# Patient Record
Sex: FEMALE | NOT HISPANIC OR LATINO | URBAN - METROPOLITAN AREA
[De-identification: names, ages, dates, MRNs, and addresses within clinical notes are randomized per-mention and may not be internally consistent; named-entity substitution may affect disease eponyms.]

---

## 2019-03-07 ENCOUNTER — HOSPITAL ENCOUNTER (OUTPATIENT)
Dept: OTHER | Facility: HOSPITAL | Age: 84
Discharge: HOME | End: 2019-03-07
Attending: PODIATRIST

## 2019-03-07 ENCOUNTER — OFFICE VISIT CONVERTED (OUTPATIENT)
Dept: PODIATRY | Facility: CLINIC | Age: 84
End: 2019-03-07
Attending: PODIATRIST

## 2019-04-04 ENCOUNTER — OFFICE VISIT CONVERTED (OUTPATIENT)
Dept: PODIATRY | Facility: CLINIC | Age: 84
End: 2019-04-04
Attending: PODIATRIST

## 2021-05-15 VITALS
HEIGHT: 63 IN | OXYGEN SATURATION: 96 % | HEART RATE: 82 BPM | DIASTOLIC BLOOD PRESSURE: 73 MMHG | SYSTOLIC BLOOD PRESSURE: 131 MMHG | WEIGHT: 159 LBS | BODY MASS INDEX: 28.17 KG/M2

## 2021-05-16 VITALS
SYSTOLIC BLOOD PRESSURE: 148 MMHG | BODY MASS INDEX: 27.82 KG/M2 | DIASTOLIC BLOOD PRESSURE: 78 MMHG | WEIGHT: 157 LBS | HEART RATE: 87 BPM | OXYGEN SATURATION: 96 % | HEIGHT: 63 IN

## 2024-10-09 ENCOUNTER — NURSING HOME (OUTPATIENT)
Dept: FAMILY MEDICINE CLINIC | Age: 89
End: 2024-10-09
Payer: MEDICARE

## 2024-10-09 VITALS
WEIGHT: 164.6 LBS | HEART RATE: 86 BPM | RESPIRATION RATE: 18 BRPM | BODY MASS INDEX: 29.16 KG/M2 | OXYGEN SATURATION: 90 % | SYSTOLIC BLOOD PRESSURE: 141 MMHG | DIASTOLIC BLOOD PRESSURE: 71 MMHG | TEMPERATURE: 97.2 F

## 2024-10-09 DIAGNOSIS — N39.46 MIXED STRESS AND URGE URINARY INCONTINENCE: ICD-10-CM

## 2024-10-09 DIAGNOSIS — K59.00 CONSTIPATION, UNSPECIFIED CONSTIPATION TYPE: ICD-10-CM

## 2024-10-09 DIAGNOSIS — M54.42 CHRONIC BILATERAL LOW BACK PAIN WITH LEFT-SIDED SCIATICA: ICD-10-CM

## 2024-10-09 DIAGNOSIS — R60.0 LEG EDEMA, LEFT: ICD-10-CM

## 2024-10-09 DIAGNOSIS — M81.0 AGE-RELATED OSTEOPOROSIS WITHOUT CURRENT PATHOLOGICAL FRACTURE: ICD-10-CM

## 2024-10-09 DIAGNOSIS — R10.32 LEFT LOWER QUADRANT PAIN: Primary | ICD-10-CM

## 2024-10-09 DIAGNOSIS — E78.00 PURE HYPERCHOLESTEROLEMIA: ICD-10-CM

## 2024-10-09 DIAGNOSIS — E10.9 TYPE 1 DIABETES MELLITUS WITHOUT COMPLICATION: ICD-10-CM
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
## 2024-10-09 DIAGNOSIS — G89.29 CHRONIC BILATERAL LOW BACK PAIN WITH LEFT-SIDED SCIATICA: ICD-10-CM

## 2024-10-09 DIAGNOSIS — K21.9 GASTROESOPHAGEAL REFLUX DISEASE WITHOUT ESOPHAGITIS: ICD-10-CM

## 2024-10-09 DIAGNOSIS — I34.0 NONRHEUMATIC MITRAL VALVE REGURGITATION: ICD-10-CM

## 2024-10-09 DIAGNOSIS — M19.91 PRIMARY OSTEOARTHRITIS, UNSPECIFIED SITE: ICD-10-CM

## 2024-10-09 DIAGNOSIS — R53.83 OTHER FATIGUE: ICD-10-CM

## 2024-10-09 DIAGNOSIS — I10 ESSENTIAL HYPERTENSION: ICD-10-CM

## 2024-10-09 NOTE — PROGRESS NOTES
Lois Valentin presents for an evaluation physician visit at the [facility] [place]    Chief Complaint : establish MD      Subjective      History of Present Illness    She is concerned about her wt, it is up 24 #s since starting insulin. She has seen the dietician about her diet.  She is a type 1 diabetic.  She is on ozempic 2 mg weekly and tresiba 22 units daily, tolerates meds well.  She is newly on a boo device for continuous glucose monitoring.  Recent blood sugars look excellent.  On October 1 her blood sugars were 147, 133, 141, 209, on October 5 127, 126, 127, 168, on Tuesday, October 8 128, 140, 152, 191.  She sees endocrinologist Dr Radha Pérez.  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  Her current medications include atenolol 25 mg every night, losartan 50 mg every day.  She is tolerating medication well.  She knows she is to avoid salt in her diet    In addition she has hypercholesterolemia and she is currently stable on atorvastatin 40 mg every night.    She had osteoporosis and is on Prolia injections every 6 months, in addition she is on calcium citrate, she is not currently on vitamin D 1000 units daily    She c/o fatigue, currently on daily B12 and Vit D.     She has joint pain with OA of hands, fingers, knees and feet, she is on meloxicam, topical voltaren gel and tylenol for pain.      History of constipation but she is stable and well-controlled on docusate sodium 100 mg 2 capsules once daily.    She also has a history of urinary incontinence and is currently on Myrbetriq 50 mg daily, she is a lot better    She has chronic GERD and she is doing well on omeprazole 20 lmg daily    H/o bad fall in dining room with LBP and a pinched nerve in her back, she has done PT and this really helped.  She uses a walker for balance.     She c/o LE swelling, onset months  ago, on lasix and K without benefit, had a normal echo of the heart.  She also states that she does have abdominal pain when she has bowel movements and this is a new concern for her.  Along with the weight gain noted above.  On review of CT of the abdomen or pelvis has ever been done.    She has a bug bite on her back this week with 2 small blisters    She has intermittent eczema prn, on topical steroid cream prn    Most recent labs: 7/31/2024, blood sugar 124, creatinine 0.57, GFR 88, sodium 135 calcium 4.5, calcium 9.7, protein 6.6, bilirubin 0.4, alkaline phosphatase 55, AST 23, ALT 19, cholesterol 145, LDL 58, triglycerides 82, HDL 71, hemoglobin A1c 6.5%      Past medical history includes hypertension, GERD, diverticulosis, plantar fasciitis, type 1 diabetes, dysphagia, hyperlipidemia, nocturia, stress incontinence, allergies, osteoarthritis, flatfoot, obstructive sleep apnea mild, osteoporosis, macular cyst of the right eye, overactive bladder, but vitamin D D deficiency, 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    Allergies include codeine    Current medications include atenolol 25 mg nightly, atorvastatin 40 mg nightly, calcium citrate 600 mg twice daily, clotrimazole and betamethasone topical cream, docusate sodium 100 mg 2 pills once a day, estradiol 0.01% cream vaginally nightly, Lasix 20 mg daily, Tresiba 22 units daily, losartan 50 mg daily, magnesium oxide 400 mg daily, meloxicam 15 mg daily, Myrbetriq 50 mg daily, omeprazole 20 mg daily, Ozempic 2 mg weekly, potassium 10 mEq daily, Prolia injections twice a year, boo continuous glucose monitoring device, vitamin B12 1000 mcg daily and vitamin D 1000 units daily.  She also has as needed medication of diclofenac gel 1%, meclizine, Tylenol, Zofran, MiraLAX, TRUEplus chew     Venous duplex performed on 9/11/2024 showed no evidence of left lower extremity DVT      ECHO 9/2024  Normal left ventricular cavity dimensions with normal  contractility EF 65   to 70%   Mild concentric hypertrophy   Normal left atrial dimensions   Normal aortic valve, normal mitral valve with mitral annular calcification   No pericardial effusion, no mural thrombus   Right ventricular cavity dimensions are normal with normal contractility,   tricuspid valve normal, pulmonic valve not well-defined   Doppler:   Aortic flows are normal   Mitral valve reveals mild regurgitation   Normal RVSP   Impression:   Concentric hypertrophy with normal contractility   Mild mitral regurgitation   Diastolic dysfunction with E to a reversal         Objective   Vital Signs:   There were no vitals taken for this visit.    Physical Exam  Vitals and nursing note reviewed.   Constitutional:       General: She is not in acute distress.     Appearance: Normal appearance. She is not ill-appearing, toxic-appearing or diaphoretic.   HENT:      Head: Normocephalic and atraumatic.      Right Ear: Tympanic membrane, ear canal and external ear normal.      Left Ear: Tympanic membrane, ear canal and external ear normal.      Nose: Nose normal. No congestion or rhinorrhea.      Mouth/Throat:      Mouth: Mucous membranes are moist.      Pharynx: Oropharynx is clear. No oropharyngeal exudate or posterior oropharyngeal erythema.   Eyes:      Extraocular Movements: Extraocular movements intact.      Conjunctiva/sclera: Conjunctivae normal.      Pupils: Pupils are equal, round, and reactive to light.   Cardiovascular:      Rate and Rhythm: Normal rate and regular rhythm.      Pulses: Normal pulses.      Heart sounds: Normal heart sounds.   Pulmonary:      Effort: Pulmonary effort is normal. No respiratory distress.      Breath sounds: Normal breath sounds. No stridor. No wheezing, rhonchi or rales.   Abdominal:      General: Abdomen is flat.      Palpations: Abdomen is soft. There is no mass.      Tenderness: There is no right CVA tenderness, left CVA tenderness, guarding or rebound. Negative signs include  Wood's sign.      Hernia: No hernia is present.       Musculoskeletal:         General: Normal range of motion.      Cervical back: Normal range of motion and neck supple. No rigidity.      Right lower leg: No edema.      Left lower leg: No edema.   Lymphadenopathy:      Cervical: No cervical adenopathy.   Skin:     General: Skin is warm and dry.      Capillary Refill: Capillary refill takes less than 2 seconds.          Neurological:      General: No focal deficit present.      Mental Status: She is alert and oriented to person, place, and time. Mental status is at baseline.   Psychiatric:         Mood and Affect: Mood normal.         Behavior: Behavior normal.         Thought Content: Thought content normal.         Judgment: Judgment normal.       BMI cannot be calculated due to outdated height or weight values.  Please input a current height/weight in Vitals and re-renter BMIFOLLOWUP in Note to pull in correct documentation based on BMI range.          Assessment and Plan   Diagnoses and all orders for this visit:    1. Left lower quadrant pain (Primary)  Comments:  Will set her up for CT of the abdomen and pelvis to further eval left lower extremity edema and left lower quadrant abdominal pain    2. Leg edema, left  Comments:  Continue Lasix at this time and compression stockings.  Will further evaluate pelvis for any cause of pressure on her vasculature    3. Type 1 diabetes mellitus without complication  Comments:  I recommend she follow-up with her endocrinologist and discussed with her changing her GLP-1 and continue Tresiba.  Rec yearly eye exam    4. Essential hypertension  Comments:  Blood pressure stable well-controlled on current treatment plan.  No changes needed current meds or treatment.  Avoid any in diet    5. Pure hypercholesterolemia  Comments:  Currently stable on low-cholesterol diet and atorvastatin, she tolerates meds well.  Will check labs and adjust Tx plan pending results    6. Bug  "bite, initial encounter  Comments:  Treat with topical hydrocortisone cream.  There is no signs of infection    7. Nonrheumatic mitral valve regurgitation    8. Age-related osteoporosis without current pathological fracture    9. Constipation, unspecified constipation type    10. Primary osteoarthritis, unspecified site  Comments:  Overall stable on current treatment plan    11. Other fatigue  Comments:  Continue vitamin B12 and vitamin D supplementation    12. Mixed stress and urge urinary incontinence  Comments:  Improved on Myrbetriq.  No changes in current meds or treatment plan    13. Gastroesophageal reflux disease without esophagitis  Comments:  Stable and well-controlled on omeprazole.  She is to avoid spicy and acidic foods    14. Chronic bilateral low back pain with left-sided sciatica        Follow Up   Return in about 3 months (around 1/9/2025), or if symptoms worsen or fail to improve, for Recheck.    Review of Systems   Constitutional:  Positive for fatigue. Negative for chills and fever.   HENT:  Negative for ear pain, sinus pressure and sore throat.    Eyes:  Negative for blurred vision and double vision.   Respiratory:  Negative for cough, shortness of breath and wheezing.    Cardiovascular:  Positive for leg swelling. Negative for chest pain and palpitations.   Gastrointestinal:  Positive for abdominal pain. Negative for blood in stool, constipation, diarrhea, nausea and vomiting.   Skin:  Negative for rash.   Neurological:  Negative for dizziness and headache.   Psychiatric/Behavioral:  Negative for sleep disturbance, suicidal ideas and depressed mood. The patient is not nervous/anxious.         Result Review   The following data was reviewed by Azul Broussard MD on 10/09/2024.  No results found for: \"WBC\", \"HGB\", \"HCT\", \"MCV\", \"PLT\"  No results found for: \"GLUCOSE\", \"BUN\", \"CREATININE\", \"NA\", \"K\", \"CL\", \"CALCIUM\", \"PROTEINTOT\", \"ALBUMIN\", \"ALT\", \"AST\", \"ALKPHOS\", \"BILITOT\", \"GLOB\", " "\"AGRATIO\", \"BCR\", \"ANIONGAP\", \"EGFR\"  No results found for: \"CHOL\", \"CHLPL\", \"TRIG\", \"HDL\", \"LDL\", \"LDLDIRECT\"  No results found for: \"TSH\"  No results found for: \"HGBA1C\"  No results found for: \"PSA\"    Part of this note may be an electronic transcription/translation of spoken language to printed   text using the Dragon Dictation System.      "

## 2024-11-13 ENCOUNTER — NURSING HOME (OUTPATIENT)
Dept: FAMILY MEDICINE CLINIC | Age: 89
End: 2024-11-13
Payer: MEDICARE

## 2024-11-13 VITALS
WEIGHT: 165 LBS | HEART RATE: 85 BPM | OXYGEN SATURATION: 97 % | DIASTOLIC BLOOD PRESSURE: 74 MMHG | SYSTOLIC BLOOD PRESSURE: 140 MMHG | RESPIRATION RATE: 18 BRPM | BODY MASS INDEX: 29.23 KG/M2 | TEMPERATURE: 97.2 F

## 2024-11-13 DIAGNOSIS — K21.9 GASTROESOPHAGEAL REFLUX DISEASE WITHOUT ESOPHAGITIS: ICD-10-CM

## 2024-11-13 DIAGNOSIS — E66.01 MORBID (SEVERE) OBESITY DUE TO EXCESS CALORIES: ICD-10-CM

## 2024-11-13 DIAGNOSIS — I10 ESSENTIAL HYPERTENSION: ICD-10-CM

## 2024-11-13 DIAGNOSIS — M54.42 CHRONIC BILATERAL LOW BACK PAIN WITH LEFT-SIDED SCIATICA: ICD-10-CM

## 2024-11-13 DIAGNOSIS — K59.00 CONSTIPATION, UNSPECIFIED CONSTIPATION TYPE: ICD-10-CM

## 2024-11-13 DIAGNOSIS — G89.29 CHRONIC BILATERAL LOW BACK PAIN WITH LEFT-SIDED SCIATICA: ICD-10-CM

## 2024-11-13 DIAGNOSIS — R60.0 BILATERAL LEG EDEMA: ICD-10-CM

## 2024-11-13 DIAGNOSIS — E10.9 TYPE 1 DIABETES MELLITUS WITHOUT COMPLICATION: Primary | ICD-10-CM

## 2024-11-13 DIAGNOSIS — E78.00 PURE HYPERCHOLESTEROLEMIA: ICD-10-CM

## 2024-11-13 NOTE — PROGRESS NOTES
Lois Valentin presents for an evaluation physician visit at the [facility] [place]    Chief Complaint : Review CT scan abdomen findings and concerns with ongoing weight gain      Subjective      History of Present Illness    I am seeing Lois again today with concerns about her weight gain.  She feels like her weight is up 24 pounds since starting insulin.  She has gained another pound since the last time I saw her.  She states she is eating very healthy and not snacking between meals and does not drink her calories.  She is trying to eat more fruits and vegetables.  She saw Radha Pérez her endocrinologist yesterday and they discussed changing Ozempic to Mounjaro and I think this is a great option.  Her hemoglobin A1c is 6.5% and excellent.  I also discussed with her decreasing her Tresiba because I think insulin is the primary cause of her weight gain.  Otherwise she is not on any other medication that could be causing her weight gain.  In addition I encouraged daily exercise and she says she walks up and down the halls all day long here at [facility].  If Mounjaro is not approved I would recommend maybe starting Farxiga.  Of note I looked in the chart and in 2019 her weight was 159 and today her weight is 165.  She also complained of chronic lower extremity edema.  This appears to be secondary to her weight.  Abdominal CT was negative for any findings.  Echo of the heart showed 70% ejection fraction    CT Abdomen Pelvis With Contrast    Result Date: 10/31/2024  No acute intra-abdominal process. Images reviewed, interpreted, and dictated by Dr. RUKHSANA Anderson. Transcribed by Harris Bazan PA-C     She also presents with chronic hypertension.  Her current medications include atenolol 25 mg every night, losartan 50 mg every day.  She is tolerating medication well.  She knows she is to avoid salt in her diet.  Weight loss would also help her blood pressure.    She presents with  hypercholesterolemia and she is currently stable on atorvastatin 40 mg every night.    She c/o fatigue, currently on daily B12 and Vit D.     History of constipation-well controlled on docusate sodium 100 mg 2 capsules once daily.    Her urinary incontinence and is currently on Myrbetriq 50 mg daily, she is a lot better    Her chronic GERD and she is doing well on omeprazole 20 lmg daily    H/o a fall with low back pain that is stable and she is now using a walker for balance     Most recent labs: 7/31/2024, blood sugar 124, creatinine 0.57, GFR 88, sodium 135 calcium 4.5, calcium 9.7, protein 6.6, bilirubin 0.4, alkaline phosphatase 55, AST 23, ALT 19, cholesterol 145, LDL 58, triglycerides 82, HDL 71, hemoglobin A1c 6.5%    Current medications include atenolol 25 mg nightly, atorvastatin 40 mg nightly, calcium citrate 600 mg twice daily, clotrimazole and betamethasone topical cream, docusate sodium 100 mg 2 pills once a day, estradiol 0.01% cream vaginally nightly, Lasix 20 mg daily, Tresiba 22 units daily, losartan 50 mg daily, magnesium oxide 400 mg daily, meloxicam 15 mg daily, Myrbetriq 50 mg daily, omeprazole 20 mg daily, Ozempic 2 mg weekly, potassium 10 mEq daily, Prolia injections twice a year, boo continuous glucose monitoring device, vitamin B12 1000 mcg daily and vitamin D 1000 units daily.  She also has as needed medication of diclofenac gel 1%, meclizine, Tylenol, Zofran, MiraLAX, TRUEplus chew     Objective   Vital Signs:   /74   Pulse 85   Temp 97.2 °F (36.2 °C)   Resp 18   Wt 74.8 kg (165 lb)   SpO2 97%   BMI 29.23 kg/m²     Physical Exam  Vitals and nursing note reviewed.   Constitutional:       General: She is not in acute distress.     Appearance: Normal appearance. She is obese. She is not ill-appearing, toxic-appearing or diaphoretic.   HENT:      Head: Normocephalic and atraumatic.      Right Ear: Tympanic membrane, ear canal and external ear normal.      Left Ear: Tympanic  membrane, ear canal and external ear normal.      Nose: Nose normal. No congestion or rhinorrhea.      Mouth/Throat:      Mouth: Mucous membranes are moist.      Pharynx: Oropharynx is clear. No oropharyngeal exudate or posterior oropharyngeal erythema.   Eyes:      Extraocular Movements: Extraocular movements intact.      Conjunctiva/sclera: Conjunctivae normal.      Pupils: Pupils are equal, round, and reactive to light.   Cardiovascular:      Rate and Rhythm: Normal rate and regular rhythm.      Pulses: Normal pulses.      Heart sounds: Normal heart sounds.   Pulmonary:      Effort: Pulmonary effort is normal. No respiratory distress.      Breath sounds: Normal breath sounds. No stridor. No wheezing, rhonchi or rales.   Abdominal:      General: Abdomen is flat.      Palpations: Abdomen is soft. There is no mass.      Tenderness: There is no right CVA tenderness, left CVA tenderness, guarding or rebound. Negative signs include Wood's sign.      Hernia: No hernia is present.       Musculoskeletal:         General: Normal range of motion.      Cervical back: Normal range of motion and neck supple. No rigidity.      Right lower leg: No edema.      Left lower leg: No edema.   Lymphadenopathy:      Cervical: No cervical adenopathy.   Skin:     General: Skin is warm and dry.      Capillary Refill: Capillary refill takes less than 2 seconds.          Neurological:      General: No focal deficit present.      Mental Status: She is alert and oriented to person, place, and time. Mental status is at baseline.   Psychiatric:         Mood and Affect: Mood normal.         Behavior: Behavior normal.         Thought Content: Thought content normal.         Judgment: Judgment normal.       BMI cannot be calculated due to outdated height or weight values.  Please input a current height/weight in Vitals and re-renter BMIFOLLOWUP in Note to pull in correct documentation based on BMI range.          Assessment and Plan   Diagnoses  and all orders for this visit:    1. Type 1 diabetes mellitus without complication (Primary)  Comments:  Will see if insurance will cover Mounjaro and stop Ozempic. Encourage healthy diet, daily exercise, may reduce insulin or start Farxiga    2. Essential hypertension  Comments:  Stable and well-controlled    3. Bilateral leg edema  Comments:  Due to her weight.  Work on weight loss.  Continue compression stockings and Lasix    4. Morbid (severe) obesity due to excess calories  Comments:  Will see if insurance will cover Mounjaro and stop Ozempic.  Encourage healthy diet, daily exercise, may reduce insulin or start Farxiga    5. Constipation, unspecified constipation type  Comments:  Stable and well-controlled    6. Pure hypercholesterolemia  Comments:  Stable on Lipitor.  No changes in current meds or treatment plan    7. Gastroesophageal reflux disease without esophagitis  Comments:  Stable.    8. Chronic bilateral low back pain with left-sided sciatica  Comments:  Stable no acute issues today.  Continue to use her walker for lower extremity weakness and balance          Follow Up   No follow-ups on file.    Review of Systems   Constitutional:  Positive for fatigue and unexpected weight gain. Negative for chills and fever.   HENT:  Negative for ear pain, sinus pressure and sore throat.    Eyes:  Negative for blurred vision and double vision.   Respiratory:  Negative for cough, shortness of breath and wheezing.    Cardiovascular:  Positive for leg swelling. Negative for chest pain and palpitations.   Gastrointestinal:  Positive for abdominal pain. Negative for blood in stool, constipation, diarrhea, nausea and vomiting.   Skin:  Negative for rash.   Neurological:  Negative for dizziness and headache.   Psychiatric/Behavioral:  Negative for sleep disturbance, suicidal ideas and depressed mood. The patient is not nervous/anxious.         Result Review   The following data was reviewed by Azul Broussard MD on  "10/09/2024.  No results found for: \"WBC\", \"HGB\", \"HCT\", \"MCV\", \"PLT\"  No results found for: \"GLUCOSE\", \"BUN\", \"CREATININE\", \"NA\", \"K\", \"CL\", \"CALCIUM\", \"PROTEINTOT\", \"ALBUMIN\", \"ALT\", \"AST\", \"ALKPHOS\", \"BILITOT\", \"GLOB\", \"AGRATIO\", \"BCR\", \"ANIONGAP\", \"EGFR\"  No results found for: \"CHOL\", \"CHLPL\", \"TRIG\", \"HDL\", \"LDL\", \"LDLDIRECT\"  No results found for: \"TSH\"  No results found for: \"HGBA1C\"  No results found for: \"PSA\"    Part of this note may be an electronic transcription/translation of spoken language to printed   text using the Dragon Dictation System.      "

## 2025-01-08 ENCOUNTER — NURSING HOME (OUTPATIENT)
Dept: FAMILY MEDICINE CLINIC | Age: 89
End: 2025-01-08
Payer: MEDICARE

## 2025-01-08 VITALS
HEART RATE: 77 BPM | BODY MASS INDEX: 28.87 KG/M2 | WEIGHT: 163 LBS | OXYGEN SATURATION: 98 % | DIASTOLIC BLOOD PRESSURE: 70 MMHG | SYSTOLIC BLOOD PRESSURE: 146 MMHG | RESPIRATION RATE: 16 BRPM | TEMPERATURE: 97.2 F

## 2025-01-08 DIAGNOSIS — N39.46 MIXED STRESS AND URGE URINARY INCONTINENCE: ICD-10-CM

## 2025-01-08 DIAGNOSIS — I10 ESSENTIAL HYPERTENSION: ICD-10-CM

## 2025-01-08 DIAGNOSIS — E66.01 MORBID (SEVERE) OBESITY DUE TO EXCESS CALORIES: ICD-10-CM

## 2025-01-08 DIAGNOSIS — G89.29 CHRONIC BILATERAL LOW BACK PAIN WITH LEFT-SIDED SCIATICA: ICD-10-CM

## 2025-01-08 DIAGNOSIS — E10.9 TYPE 1 DIABETES MELLITUS WITHOUT COMPLICATION: ICD-10-CM

## 2025-01-08 DIAGNOSIS — K59.00 CONSTIPATION, UNSPECIFIED CONSTIPATION TYPE: ICD-10-CM

## 2025-01-08 DIAGNOSIS — M54.42 CHRONIC BILATERAL LOW BACK PAIN WITH LEFT-SIDED SCIATICA: ICD-10-CM

## 2025-01-08 DIAGNOSIS — J34.89 NASAL SORE: Primary | ICD-10-CM

## 2025-01-08 DIAGNOSIS — J30.9 ALLERGIC RHINITIS, UNSPECIFIED SEASONALITY, UNSPECIFIED TRIGGER: ICD-10-CM

## 2025-01-08 DIAGNOSIS — K21.9 GASTROESOPHAGEAL REFLUX DISEASE WITHOUT ESOPHAGITIS: ICD-10-CM

## 2025-01-08 DIAGNOSIS — E78.00 PURE HYPERCHOLESTEROLEMIA: ICD-10-CM

## 2025-01-08 DIAGNOSIS — R60.0 BILATERAL LEG EDEMA: ICD-10-CM

## 2025-01-08 PROCEDURE — 99348 HOME/RES VST EST LOW MDM 30: CPT | Performed by: FAMILY MEDICINE

## 2025-01-08 NOTE — PROGRESS NOTES
Lois Valentin presents for an evaluation physician visit at the [facility]    Chief Complaint: Nasal sores and follow-up for med review/diabetes      Subjective      History of Present Illness    She has type 2 diabetes, currently seeing endocrinologist Radha Pérez, newly on Mounjaro 2.5 mg weekly, tolerating med well, but no wt loss.  BS are well controlled.  She is also on Tresibia insulin 22 units daily Hgb 6.5%     She also complained of chronic lower extremity edema.  This appears to be secondary to her weight.  Abdominal CT was negative for any findings.  Echo of the heart showed 70% ejection fraction.  CT Abdomen Pelvis With Contrast 10/31/2024:  No acute intra-abdominal process. Images reviewed, interpreted, and dictated by Dr. RUKHSANA Anderson. Transcribed by Harris Bazan PA-C      She presents with chronic hypertension.  Her current medications include atenolol 25 mg every night, losartan 50 mg every day.  She is tolerating medication well.  She knows she is to avoid salt in her diet.  Weight loss would also help her blood pressure.     She presents with chronic hypercholesterolemia and she is currently stable on atorvastatin 40 mg every night.     She c/o fatigue, currently stable on daily B12 and Vit D.      She presents with constipation-well controlled on docusate sodium 100 mg 2 capsules once daily.     She presents with urinary incontinence and is currently on Myrbetriq 50 mg daily, she is a lot better, recc daily kegel exercises.       She presents with chronic GERD and she is doing well on omeprazole 20 mg daily,she is to avoid     She presents with chronic low back pain that is stable and she is now using a walker for balance and takes Tylenol as needed.  No recent fall     Most recent labs: 10/23/24: WBC 4.8, hemoglobin 11.8, hematocrit 35.3, platelets 263, blood sugar 123, creatinine 0.62, GFR 86, sodium 135, potassium 4.4, calcium 9.7, protein 6.2, alkaline  phosphatase 57, AST 22, ALT 16, cholesterol 151, triglycerides 65, HDL 71, LDL 67, hemoglobin A1c 6.5%, TSH 1.6, vitamin D 38.9, vitamin B12 1690     Current medications include atenolol 25 mg nightly, atorvastatin 40 mg nightly, calcium citrate 600 mg twice daily, clotrimazole and betamethasone topical cream, docusate sodium 100 mg 2 pills once a day, estradiol 0.01% cream vaginally nightly, Lasix 20 mg daily, Tresiba 22 units daily, losartan 50 mg daily, magnesium oxide 400 mg daily, meloxicam 15 mg daily, Myrbetriq 50 mg daily, omeprazole 20 mg daily, Ozempic 2 mg weekly, potassium 10 mEq daily, Prolia injections twice a year, boo continuous glucose monitoring device, vitamin B12 1000 mcg daily and vitamin D 1000 units daily.  She also has as needed medication of diclofenac gel 1%, meclizine, Tylenol, Zofran, MiraLAX, TRUEplus chew                   Objective   Vital Signs:   /70   Pulse 77   Temp 97.2 °F (36.2 °C)   Resp 16   Wt 73.9 kg (163 lb)   SpO2 98%   BMI 28.87 kg/m²     Physical Exam  Vitals and nursing note reviewed.   Constitutional:       General: She is not in acute distress.     Appearance: Normal appearance. She is not ill-appearing, toxic-appearing or diaphoretic.   HENT:      Head: Normocephalic and atraumatic.      Right Ear: Tympanic membrane, ear canal and external ear normal.      Left Ear: Tympanic membrane, ear canal and external ear normal.      Nose: No congestion or rhinorrhea.      Right Turbinates: Swollen and pale.        Mouth/Throat:      Mouth: Mucous membranes are moist.      Pharynx: Oropharynx is clear. No oropharyngeal exudate or posterior oropharyngeal erythema.   Eyes:      Extraocular Movements: Extraocular movements intact.      Conjunctiva/sclera: Conjunctivae normal.      Pupils: Pupils are equal, round, and reactive to light.   Cardiovascular:      Rate and Rhythm: Normal rate and regular rhythm.      Pulses:           Dorsalis pedis pulses are 2+ on the  right side and 2+ on the left side.      Heart sounds: Normal heart sounds.   Pulmonary:      Effort: Pulmonary effort is normal.      Breath sounds: Normal breath sounds. No wheezing, rhonchi or rales.   Abdominal:      General: Abdomen is flat.      Palpations: Abdomen is soft. There is no mass.      Tenderness: There is no abdominal tenderness.      Hernia: No hernia is present.   Musculoskeletal:      Cervical back: Neck supple. No rigidity.      Right lower leg: No edema.      Left lower leg: No edema.   Feet:      Right foot:      Protective Sensation: 7 sites tested.  2 sites sensed.      Skin integrity: Skin integrity normal. No ulcer or blister.      Toenail Condition: Right toenails are normal.      Left foot:      Protective Sensation: 7 sites tested.  5 sites sensed.      Skin integrity: Skin integrity normal. No ulcer or blister.      Toenail Condition: Left toenails are normal.      Comments: Diabetic Foot Exam Performed and Monofilament Test Performed     Lymphadenopathy:      Cervical: No cervical adenopathy.   Skin:     General: Skin is warm and dry.   Neurological:      General: No focal deficit present.      Mental Status: She is alert and oriented to person, place, and time. Mental status is at baseline.   Psychiatric:         Mood and Affect: Mood normal.         Behavior: Behavior normal.         Thought Content: Thought content normal.         Judgment: Judgment normal.       BMI cannot be calculated due to outdated height or weight values.  Please input a current height/weight in Vitals and re-renter BMIFOLLOWUP in Note to pull in correct documentation based on BMI range.          Assessment and Plan   Diagnoses and all orders for this visit:    1. Nasal sore (Primary)  Comments:  Rec humidifier at night, nasal saline spray 4 times daily and Bactroban ointment nasal twice daily    2. Allergic rhinitis, unspecified seasonality, unspecified trigger  Comments:  Will start her on Allegra 180 mg  daily    3. Type 1 diabetes mellitus without complication  Comments:  She sees endocrinology and currently on Tresiba 22 units daily and Mounjaro 2.5 mg weekly, no weight loss yet.  Foot exam positive peripheral neuropathy    4. Essential hypertension  Comments:  Borderline elevated today but overall well-controlled on current treatment plan.  No changes in current meds or Tx plan    5. Bilateral leg edema  Comments:  Stable with compression stockings, elevating legs and taking Lasix 20 mg daily.  Continue to work on weight loss and healthy diet    6. Morbid (severe) obesity due to excess calories  Comments:  She is try to work hard on healthy diet and daily exercise.  Currently on Mounjaro.  Recommend increasing Mounjaro to 5 mg weekly    7. Constipation, unspecified constipation type  Comments:  Overall stable on  mg daily    8. Pure hypercholesterolemia  Comments:  Well-controlled on atorvastatin and she tolerates meds well.  Labs reviewed and no changes in current meds/Tx plan    9. Gastroesophageal reflux disease without esophagitis  Comments:  Stable.  She is to continue omeprazole and avoiding spicy and acidic food and diet    10. Chronic bilateral low back pain with left-sided sciatica  Comments:  No acute issues today.  Continue Tylenol as needed    11. Mixed stress and urge urinary incontinence  Comments:  Stable on Myrbetriq.  Recommend Kegel exercises        Follow Up   Return in about 3 months (around 4/8/2025) for Recheck.    Review of Systems   Constitutional:  Negative for chills and fever.   HENT:  Positive for congestion, nosebleeds and postnasal drip. Negative for ear pain, sinus pressure, sore throat and trouble swallowing.    Eyes:  Negative for blurred vision and double vision.   Respiratory:  Negative for cough, shortness of breath and wheezing.    Cardiovascular:  Negative for chest pain and palpitations.   Gastrointestinal:  Negative for abdominal pain, blood in stool, constipation,  "diarrhea, nausea and vomiting.   Skin:  Negative for rash.   Neurological:  Negative for dizziness and headache.   Psychiatric/Behavioral:  Negative for sleep disturbance, suicidal ideas and depressed mood. The patient is not nervous/anxious.         Result Review   The following data was reviewed by Azul Broussard MD on 01/08/2025.  No results found for: \"WBC\", \"HGB\", \"HCT\", \"MCV\", \"PLT\"  No results found for: \"GLUCOSE\", \"BUN\", \"CREATININE\", \"NA\", \"K\", \"CL\", \"CALCIUM\", \"PROTEINTOT\", \"ALBUMIN\", \"ALT\", \"AST\", \"ALKPHOS\", \"BILITOT\", \"GLOB\", \"AGRATIO\", \"BCR\", \"ANIONGAP\", \"EGFR\"  No results found for: \"CHOL\", \"CHLPL\", \"TRIG\", \"HDL\", \"LDL\", \"LDLDIRECT\"  No results found for: \"TSH\"  No results found for: \"HGBA1C\"  No results found for: \"PSA\"    Part of this note may be an electronic transcription/translation of spoken language to printed   text using the Dragon Dictation System.      "

## 2025-02-07 DIAGNOSIS — E10.9 TYPE 1 DIABETES MELLITUS WITHOUT COMPLICATION: Primary | ICD-10-CM

## 2025-04-17 ENCOUNTER — NURSING HOME (OUTPATIENT)
Dept: FAMILY MEDICINE CLINIC | Age: 89
End: 2025-04-17
Payer: MEDICARE

## 2025-04-17 VITALS
TEMPERATURE: 97.2 F | BODY MASS INDEX: 29.3 KG/M2 | HEART RATE: 81 BPM | SYSTOLIC BLOOD PRESSURE: 137 MMHG | RESPIRATION RATE: 16 BRPM | WEIGHT: 165.4 LBS | DIASTOLIC BLOOD PRESSURE: 75 MMHG | HEIGHT: 63 IN

## 2025-04-17 DIAGNOSIS — K21.9 GASTROESOPHAGEAL REFLUX DISEASE WITHOUT ESOPHAGITIS: ICD-10-CM

## 2025-04-17 DIAGNOSIS — N39.46 MIXED STRESS AND URGE URINARY INCONTINENCE: ICD-10-CM

## 2025-04-17 DIAGNOSIS — J30.9 ALLERGIC RHINITIS, UNSPECIFIED SEASONALITY, UNSPECIFIED TRIGGER: ICD-10-CM

## 2025-04-17 DIAGNOSIS — E10.9 TYPE 1 DIABETES MELLITUS WITHOUT COMPLICATION: Primary | ICD-10-CM

## 2025-04-17 DIAGNOSIS — M81.0 AGE-RELATED OSTEOPOROSIS WITHOUT CURRENT PATHOLOGICAL FRACTURE: ICD-10-CM

## 2025-04-17 DIAGNOSIS — E78.00 PURE HYPERCHOLESTEROLEMIA: ICD-10-CM

## 2025-04-17 DIAGNOSIS — K59.00 CONSTIPATION, UNSPECIFIED CONSTIPATION TYPE: ICD-10-CM

## 2025-04-17 DIAGNOSIS — R60.0 BILATERAL LEG EDEMA: ICD-10-CM

## 2025-04-17 DIAGNOSIS — I10 ESSENTIAL HYPERTENSION: ICD-10-CM

## 2025-04-17 RX ORDER — ALENDRONATE SODIUM 70 MG/1
70 TABLET ORAL
Qty: 13 TABLET | Refills: 3 | Status: SHIPPED | OUTPATIENT
Start: 2025-04-17

## 2025-04-17 NOTE — PROGRESS NOTES
Lois Valentin presents for an evaluation physician visit at the [facility] [place]    Chief Complaint : Review CT scan abdomen findings and concerns with ongoing weight gain      Subjective      History of Present Illness    She presents with worsening joint pains in hands, knees, thighs and feet.  Pain moderate severity, comes and goes, soreness with standing and laying down at night, achey      She saw Radha Pérez her endocrinologist and changed her to Mounjaro and decreaased her insulin dose.  Her hemoglobin A1c is 6.6% and excellent.  I encouraged daily exercise and she says she walks up and down the halls all day long here at [facility]. Wt is stable but unchanged at 165#      She also complained of chronic lower extremity edema.  Abdominal CT was negative for any findings.  Echo of the heart showed 70% ejection fraction.  She is on lasix 20 mg daily,  she is also on a potassium supplement and the potassium pill comes out holding her stool so she does not feel like it is being absorbed.She wears her compression stockings and avoid Na in her diet      She  presents with chronic hypertension.  Her current medications include atenolol 25 mg every night, losartan 50 mg every day and lasix.  She is tolerating medication well.  She knows she is to avoid salt in her diet.  Weight loss would also help her blood pressure.    She presents with hypercholesterolemia and she is currently stable on atorvastatin 40 mg every night.She tolerates meds well    She has OP of hip, 2024 dexa showed   DXA bone density spine and hip  Impression  1. Normal BMD of the lumbar spine. No increased risk for fracture.  2. Osteoporotic BMD of the left femoral neck. Increased risk for  fracture.   Previous Dexa hip T score -2.8 and L spine -1.4  SHE IS ON PROLIA and tolerated well.  Will check on when her Prolia was first started.  She thinks she is only been on it for 2 years.    Her fatigue is stable on daily B12 and Vit D.   "She is sleeping well at night    She presents with chronic constipation-remains well controlled on docusate sodium 100 mg 2 capsules once daily.    She presents with chronic urinary incontinence and is currently on Myrbetriq 50 mg daily and reports today she is doing well    Her chronic GERD and she is well controlled on omeprazole 20 lmg daily    H/o a fall with low back pain that is stable and she is now using a walker for balance and she is much better, no c/o pain today s/p therapy.  She still uses a walker    Labs 3/12/2025 urinalysis pH 8 urine color yellow 3+ white blood cells negative glucose negative ketones negative bilirubin negative nitrates negative red blood cells    Labs 1/29/2025: Blood sugar 121, creatinine 0.74, GFR 78, sodium 135, potassium 4.6, calcium 9.7, alkaline phosphatase 57, AST 21, ALT 12, cholesterol 157, triglycerides 57, HDL 69, LDL 76, hemoglobin A1c 6.6%  .  She is sent over atorvastatin  Current medications include atenolol 25 mg nightly, atorvastatin 40 mg nightly, calcium citrate 600 mg twice daily, clotrimazole and betamethasone topical cream, docusate sodium 100 mg 2 pills once a day, estradiol 0.01% cream vaginally nightly, Lasix 20 mg daily, Tresiba 22 units daily, losartan 50 mg daily, magnesium oxide 400 mg daily, meloxicam 15 mg daily, Myrbetriq 50 mg daily, omeprazole 20 mg daily, Ozempic 2 mg weekly, potassium 10 mEq daily, Prolia injections twice a year, boo continuous glucose monitoring device, vitamin B12 1000 mcg daily and vitamin D 1000 units daily.  She also has as needed medication of diclofenac gel 1%, meclizine, Tylenol, Zofran, MiraLAX, TRUEplus chew     Objective   Vital Signs:   /75   Pulse 81   Temp 97.2 °F (36.2 °C)   Resp 16   Ht 160 cm (63\")   Wt 75 kg (165 lb 6.4 oz)   BMI 29.30 kg/m²     Physical Exam  Vitals and nursing note reviewed.   Constitutional:       General: She is not in acute distress.     Appearance: Normal appearance. She is " not ill-appearing, toxic-appearing or diaphoretic.   HENT:      Head: Normocephalic and atraumatic.      Right Ear: Tympanic membrane, ear canal and external ear normal.      Left Ear: Tympanic membrane, ear canal and external ear normal.      Nose: No congestion or rhinorrhea.      Mouth/Throat:      Mouth: Mucous membranes are moist.      Pharynx: Oropharynx is clear. No oropharyngeal exudate or posterior oropharyngeal erythema.   Eyes:      Extraocular Movements: Extraocular movements intact.      Conjunctiva/sclera: Conjunctivae normal.      Pupils: Pupils are equal, round, and reactive to light.   Cardiovascular:      Rate and Rhythm: Normal rate and regular rhythm.      Heart sounds: Normal heart sounds. No murmur heard.  Pulmonary:      Effort: Pulmonary effort is normal.      Breath sounds: Normal breath sounds. No wheezing, rhonchi or rales.   Abdominal:      General: Abdomen is flat.      Palpations: Abdomen is soft. There is no mass.      Tenderness: There is no abdominal tenderness.      Hernia: No hernia is present.   Musculoskeletal:      Cervical back: Neck supple. No rigidity.      Right lower le+ Edema present.      Left lower le+ Edema present.   Lymphadenopathy:      Cervical: No cervical adenopathy.   Skin:     General: Skin is warm and dry.   Neurological:      General: No focal deficit present.      Mental Status: She is alert and oriented to person, place, and time. Mental status is at baseline.      Motor: Weakness present.      Gait: Gait abnormal.   Psychiatric:         Mood and Affect: Mood normal.         Behavior: Behavior normal.         Thought Content: Thought content normal.         Judgment: Judgment normal.       BMI is >= 25 and <30. (Overweight) The following options were offered after discussion;: exercise counseling/recommendations and nutrition counseling/recommendations           Assessment and Plan   Diagnoses and all orders for this visit:    1. Type 1 diabetes  mellitus without complication (Primary)  Comments:  Stable on TRESIBA, hemoglobin A1c 6.6%, newly on Mounjaro at 7.5 mg weekly dose without any changes in weight.  Weight is stable though.  ConT yearly eye exam    2. Essential hypertension  Comments:  Blood sugar stable and well-controlled on current treatment plan.  No changes made to current medications.  Avoid NA in diet    3. Bilateral leg edema  Comments:  CHRONIC LE edema.  She does not like her K tablets and feels like her LE edema is worse, will stop the potassium and start her on spironolactone and cont Lasix    4. Allergic rhinitis, unspecified seasonality, unspecified trigger  Comments:  I will use her currently stable and well-controlled    5. Constipation, unspecified constipation type  Comments:  The patient is well-controlled on stool softener    6. Pure hypercholesterolemia  Comments:  Labs reviewed, she is currently stable on atorvastatin 40 mg nightly and tolerates med well    7. Mixed stress and urge urinary incontinence  Comments:  Stable on Myrbetriq    8. Gastroesophageal reflux disease without esophagitis  Comments:  Will on omeprazole and she should continue to avoid spicy and acidic food in her diet    9. Age-related osteoporosis without current pathological fracture  Comments:  Currently on Prolia and tolerates well, will check on date of when Prolia was started, DEXA is reviewed with her today            Follow Up   No follow-ups on file.    Review of Systems   Constitutional:  Positive for fatigue and unexpected weight gain. Negative for chills and fever.   HENT:  Negative for ear pain, sinus pressure and sore throat.    Eyes:  Negative for blurred vision and double vision.   Respiratory:  Negative for cough, shortness of breath and wheezing.    Cardiovascular:  Positive for leg swelling. Negative for chest pain and palpitations.   Gastrointestinal:  Positive for abdominal pain. Negative for blood in stool, constipation, diarrhea, nausea  "and vomiting.   Skin:  Negative for rash.   Neurological:  Negative for dizziness and headache.   Psychiatric/Behavioral:  Negative for sleep disturbance, suicidal ideas and depressed mood. The patient is not nervous/anxious.         Result Review   The following data was reviewed by Azul Broussard MD on 10/09/2024.  No results found for: \"WBC\", \"HGB\", \"HCT\", \"MCV\", \"PLT\"  No results found for: \"GLUCOSE\", \"BUN\", \"CREATININE\", \"NA\", \"K\", \"CL\", \"CALCIUM\", \"PROTEINTOT\", \"ALBUMIN\", \"ALT\", \"AST\", \"ALKPHOS\", \"BILITOT\", \"GLOB\", \"AGRATIO\", \"BCR\", \"ANIONGAP\", \"EGFR\"  No results found for: \"CHOL\", \"CHLPL\", \"TRIG\", \"HDL\", \"LDL\", \"LDLDIRECT\"  No results found for: \"TSH\"  No results found for: \"HGBA1C\"  No results found for: \"PSA\"    Part of this note may be an electronic transcription/translation of spoken language to printed   text using the Dragon Dictation System.      "

## 2025-05-19 ENCOUNTER — TELEMEDICINE (OUTPATIENT)
Dept: FAMILY MEDICINE CLINIC | Age: 89
End: 2025-05-19
Payer: MEDICARE

## 2025-05-19 VITALS
DIASTOLIC BLOOD PRESSURE: 76 MMHG | HEIGHT: 63 IN | SYSTOLIC BLOOD PRESSURE: 135 MMHG | BODY MASS INDEX: 29.2 KG/M2 | TEMPERATURE: 97.4 F | OXYGEN SATURATION: 96 % | WEIGHT: 164.8 LBS | RESPIRATION RATE: 18 BRPM | HEART RATE: 87 BPM

## 2025-05-19 DIAGNOSIS — J40 BRONCHITIS: Primary | ICD-10-CM

## 2025-05-19 RX ORDER — CETIRIZINE HYDROCHLORIDE 10 MG/1
TABLET ORAL
COMMUNITY

## 2025-05-19 RX ORDER — SPIRONOLACTONE 25 MG/1
TABLET ORAL
COMMUNITY
Start: 2025-05-12

## 2025-05-19 RX ORDER — INSULIN DEGLUDEC 100 U/ML
INJECTION, SOLUTION SUBCUTANEOUS
COMMUNITY
Start: 2025-04-17

## 2025-05-19 RX ORDER — VALSARTAN 160 MG/1
160 TABLET ORAL DAILY
COMMUNITY

## 2025-05-19 RX ORDER — DEXTROMETHORPHAN HYDROBROMIDE AND PROMETHAZINE HYDROCHLORIDE 15; 6.25 MG/5ML; MG/5ML
5 SYRUP ORAL 4 TIMES DAILY PRN
Qty: 180 ML | Refills: 0 | Status: SHIPPED | OUTPATIENT
Start: 2025-05-19

## 2025-05-19 RX ORDER — ONDANSETRON 4 MG/1
TABLET, FILM COATED ORAL
COMMUNITY

## 2025-05-19 RX ORDER — PSEUDOEPHEDRINE HCL 30 MG
TABLET ORAL
COMMUNITY

## 2025-05-19 RX ORDER — UBIDECARENONE 75 MG
50 CAPSULE ORAL DAILY
COMMUNITY

## 2025-05-19 RX ORDER — POTASSIUM CHLORIDE 750 MG/1
10 CAPSULE, EXTENDED RELEASE ORAL
COMMUNITY

## 2025-05-19 RX ORDER — ATORVASTATIN CALCIUM 40 MG/1
40 TABLET, FILM COATED ORAL DAILY
COMMUNITY

## 2025-05-19 RX ORDER — LANOLIN ALCOHOL/MO/W.PET/CERES
CREAM (GRAM) TOPICAL
COMMUNITY

## 2025-05-19 RX ORDER — MIRABEGRON 50 MG/1
TABLET, FILM COATED, EXTENDED RELEASE ORAL
COMMUNITY
Start: 2025-05-09

## 2025-05-19 RX ORDER — METOCLOPRAMIDE 5 MG/1
TABLET ORAL
COMMUNITY

## 2025-05-19 RX ORDER — FLUTICASONE PROPIONATE 50 MCG
2 SPRAY, SUSPENSION (ML) NASAL DAILY
Qty: 16 G | Refills: 0 | Status: SHIPPED | OUTPATIENT
Start: 2025-05-19

## 2025-05-19 RX ORDER — DENOSUMAB 60 MG/ML
60 INJECTION SUBCUTANEOUS
COMMUNITY

## 2025-05-19 RX ORDER — CEFDINIR 300 MG/1
300 CAPSULE ORAL 2 TIMES DAILY
Qty: 20 CAPSULE | Refills: 0 | Status: SHIPPED | OUTPATIENT
Start: 2025-05-19

## 2025-05-19 RX ORDER — FUROSEMIDE 20 MG/1
TABLET ORAL
COMMUNITY
Start: 2025-05-09

## 2025-05-19 RX ORDER — ASPIRIN 81 MG/1
81 TABLET ORAL DAILY
COMMUNITY

## 2025-05-19 RX ORDER — NITROFURANTOIN 25; 75 MG/1; MG/1
CAPSULE ORAL
COMMUNITY
Start: 2025-03-12

## 2025-05-19 RX ORDER — TOLTERODINE 4 MG/1
CAPSULE, EXTENDED RELEASE ORAL
COMMUNITY

## 2025-05-19 RX ORDER — ATENOLOL 25 MG/1
25 TABLET ORAL DAILY
COMMUNITY

## 2025-05-19 RX ORDER — OMEPRAZOLE 20 MG/1
20 CAPSULE, DELAYED RELEASE ORAL DAILY
COMMUNITY

## 2025-05-19 RX ORDER — LOSARTAN POTASSIUM 50 MG/1
TABLET ORAL
COMMUNITY
Start: 2025-05-19

## 2025-05-19 RX ORDER — FLURBIPROFEN SODIUM 0.3 MG/ML
SOLUTION/ DROPS OPHTHALMIC
COMMUNITY
Start: 2025-01-29

## 2025-05-19 RX ORDER — CHOLECALCIFEROL (VITAMIN D3) 25 MCG
1000 TABLET ORAL DAILY
COMMUNITY

## 2025-05-19 RX ORDER — MELOXICAM 15 MG/1
TABLET ORAL
COMMUNITY
Start: 2025-05-09

## 2025-05-19 NOTE — PROGRESS NOTES
Lois Valentin presents to [facility].  Patient is being seen, via telehealth, and pt is not in the office.  Mychart or doximetry was used for this visit. Lois and I were able to hear each other simultaneously in real time. I introduced myself and verified Lois identity. I explained how the telemedicine visit will occur. Lois is aware they may terminate the telemedicine encounter and withdraw his/her consent for receiving care via telemedicine without affecting their ability to receive future care from us, and that I may also terminate the telemedicine encounter if I determine that an in-person visit is more appropriate for the condition[s] for which treatment is sought.  Lois verbally gave consent for the use of telemedicine in her care. Lois is also aware that this visit will be charged as a regular OV which may require a copay      Chief Complaint: cold symptoms    Subjective     History of Present Illness:  URI   Associated symptoms include chest pain, coughing and a sore throat. Pertinent negatives include no abdominal pain, diarrhea, ear pain, nausea, rash, vomiting or wheezing.   Cough  Associated symptoms: chest pain, shortness of breath and sore throat    Associated symptoms: no chills, no ear pain, no fever, no rash and no wheezing    Sore Throat  Associated symptoms: cough and shortness of breath    Associated symptoms: no abdominal pain, no diarrhea, no ear pain and no vomiting    Atrial Fibrillation  Symptoms include chest pain and shortness of breath. Symptoms are negative for dizziness and palpitations. Past medical history includes atrial fibrillation.   Chest Pain   Associated symptoms include a cough and shortness of breath. Pertinent negatives include no abdominal pain, dizziness, fever, nausea, palpitations or vomiting.     URI onset last week, Wednesday, and she started with productive cough with SOA, she is on muccinex over the weekend  that helped loosen up the sputum.  She also has a runny nose.  No fever/N/V/ear pain.  Her cough burns and her throat is red and sore.      Result Review   The following data was reviewed by Azul Broussard MD on 05/19/2025.  Neg covid and flu test 5/19/25           Assessment and Plan:   Assessment & Plan  Bronchitis  Day 6 with worsening symptoms.  More shortness of air and productive dark cough along with rhinorrhea.  Will go ahead and treat with cefdinir 300 mg twice daily for bronchitis, will start on Flonase nasal spray and Promethazine DM cough syrup.  She was canceled that the cough syrup can interfere with her Reglan.  She only he takes her Reglan once a day in the morning so she should be fine.  She is to contact nursing at [place] to have them reach out to me if she has worsening symptoms or no improvement.  If she becomes more short of air then she will need to be seen.  Orders:    fluticasone (FLONASE) 50 MCG/ACT nasal spray; Administer 2 sprays into the nostril(s) as directed by provider Daily.    cefdinir (OMNICEF) 300 MG capsule; Take 1 capsule by mouth 2 (Two) Times a Day.    promethazine-dextromethorphan (PROMETHAZINE-DM) 6.25-15 MG/5ML syrup; Take 5 mL by mouth 4 (Four) Times a Day As Needed for Cough.               Objective     Medications:  Current Outpatient Medications   Medication Instructions    alendronate (FOSAMAX) 70 mg, Oral, Every 7 Days    aspirin 81 mg, Oral, Daily    atenolol (TENORMIN) 25 mg, Oral, Daily    atorvastatin (LIPITOR) 40 mg, Oral, Daily    B-D UF III MINI PEN NEEDLES 31G X 5 MM misc     Canagliflozin (INVOKANA) 100 mg, Oral, Daily    cefdinir (OMNICEF) 300 mg, Oral, 2 Times Daily    cetirizine (zyrTEC) 10 MG tablet cetirizine 10 mg oral tablet take 1 tablet (10 mg) by oral route once daily   Active    cholecalciferol (VITAMIN D3) 1,000 Units, Oral, Daily    Continuous Glucose Sensor (FreeStyle Valentin 2 Sensor) misc CHANGE EVERY 14 DAYS    docusate sodium 100 MG  "capsule docusate sodium 100 mg oral capsule take 2 capsules (200 mg) by oral route once daily at bedtime as needed   Active    DULAGLUTIDE SC 0.75 mg, Subcutaneous    fluticasone (FLONASE) 50 MCG/ACT nasal spray 2 sprays, Nasal, Daily    furosemide (LASIX) 20 MG tablet     losartan (COZAAR) 50 MG tablet     Magnesium Oxide -Mg Supplement 400 (240 Mg) MG tablet Oral    meloxicam (MOBIC) 15 MG tablet     metoclopramide (REGLAN) 5 MG tablet metoclopramide HCl 5 mg oral tablet take 1 tablet by oral route 3 times a day   Active    Myrbetriq 50 MG tablet sustained-release 24 hour 24 hr tablet     nitrofurantoin, macrocrystal-monohydrate, (MACROBID) 100 MG capsule     omeprazole (PRILOSEC) 20 mg, Oral, Daily    ondansetron (ZOFRAN) 4 MG tablet ondansetron HCl 4 mg oral tablet take 1 tablet by oral route every 8 hours   Active    potassium chloride (MICRO-K) 10 MEQ CR capsule 10 mEq, Oral    Prolia 60 mg, Subcutaneous    promethazine-dextromethorphan (PROMETHAZINE-DM) 6.25-15 MG/5ML syrup 5 mL, Oral, 4 Times Daily PRN    spironolactone (ALDACTONE) 25 MG tablet     Tirzepatide 7.5 mg, Subcutaneous, Weekly    tolterodine LA (DETROL LA) 4 MG 24 hr capsule tolterodine 4 mg oral capsule,extended release 24hr take 1 capsule (4 mg) by oral route once daily   Active    Tresiba FlexTouch 100 UNIT/ML solution pen-injector injection     valsartan (DIOVAN) 160 mg, Oral, Daily    vitamin B-12 (CYANOCOBALAMIN) 50 mcg, Oral, Daily    Vitamin D3 1,000 Units, Oral, Daily        Vital Signs:   /76   Pulse 87   Temp 97.4 °F (36.3 °C)   Resp 18   Ht 160 cm (62.99\")   Wt 74.8 kg (164 lb 12.8 oz)   SpO2 96%   BMI 29.20 kg/m²           BP Readings from Last 3 Encounters:   05/19/25 135/76   04/17/25 137/75   01/08/25 146/70      Wt Readings from Last 3 Encounters:   05/19/25 74.8 kg (164 lb 12.8 oz)   04/17/25 75 kg (165 lb 6.4 oz)   01/08/25 73.9 kg (163 lb)        Physical Exam:  Physical Exam  Vitals reviewed.   Constitutional:  "      General: She is not in acute distress.     Appearance: Normal appearance. She is normal weight. She is not ill-appearing.   HENT:      Head: Normocephalic and atraumatic.   Eyes:      Extraocular Movements: Extraocular movements intact.      Pupils: Pupils are equal, round, and reactive to light.   Pulmonary:      Effort: Pulmonary effort is normal.   Neurological:      General: No focal deficit present.      Mental Status: She is alert and oriented to person, place, and time.   Psychiatric:         Mood and Affect: Mood normal.         Behavior: Behavior normal.         Thought Content: Thought content normal.         Judgment: Judgment normal.           Review of Systems:  Review of Systems   Constitutional:  Positive for fatigue. Negative for chills and fever.   HENT:  Positive for sore throat. Negative for ear pain and sinus pressure.    Eyes:  Negative for blurred vision and double vision.   Respiratory:  Positive for cough and shortness of breath. Negative for wheezing.    Cardiovascular:  Positive for chest pain. Negative for palpitations and leg swelling.   Gastrointestinal:  Negative for abdominal pain, blood in stool, constipation, diarrhea, nausea and vomiting.   Skin:  Negative for rash.   Neurological:  Negative for dizziness and headache.   Psychiatric/Behavioral:  Negative for sleep disturbance, suicidal ideas and depressed mood. The patient is not nervous/anxious.               Follow Up   Return if symptoms worsen or fail to improve.    Part of this note may be an electronic transcription/translation of spoken language to printed   text using the Dragon Dictation System.              Health Maintenance   Topic Date Due    URINE MICROALBUMIN-CREATININE RATIO (uACR)  Never done    DIABETIC EYE EXAM  04/15/2020    COVID-19 Vaccine (9 - 2024-25 season) 03/27/2025    HEMOGLOBIN A1C  04/23/2025    INFLUENZA VACCINE  07/01/2025    ANNUAL WELLNESS VISIT  08/23/2025    LIPID PANEL  10/23/2025     DIABETIC FOOT EXAM  01/08/2026    DXA SCAN  02/22/2026    TDAP/TD VACCINES (3 - Td or Tdap) 03/15/2034    RSV Vaccine - Adults  Completed    Pneumococcal Vaccine 50+  Completed    ZOSTER VACCINE  Completed          Medical History:  There are no discontinued medications.   No past medical history on file.  No past surgical history on file.   No family history on file.  Social History     Tobacco Use    Smoking status: Never     Passive exposure: Never    Smokeless tobacco: Never   Substance Use Topics    Alcohol use: Not on file       Health Maintenance Due   Topic Date Due    URINE MICROALBUMIN-CREATININE RATIO (uACR)  Never done    DIABETIC EYE EXAM  04/15/2020    COVID-19 Vaccine (9 - 2024-25 season) 03/27/2025    HEMOGLOBIN A1C  04/23/2025        Immunization History   Administered Date(s) Administered    Arexvy (RSV, Adults 60+ yrs) 03/14/2024    COVID-19 (PFIZER) 12YRS+ (COMIRNATY) 10/25/2023, 09/27/2024    COVID-19 (PFIZER) BIVALENT 12+YRS 10/01/2022, 05/17/2023    COVID-19 (PFIZER) Purple Cap Monovalent 01/22/2021, 02/12/2021, 10/31/2021, 05/04/2022    FLUAD TRI 65YR+ 09/27/2024    Fluad Quad 65+ 10/08/2021, 10/01/2022, 09/21/2023    Fluzone High-Dose 65+YRS 09/24/2016, 09/19/2017, 09/20/2017    Hepatitis A 06/08/2018, 01/25/2019    Influenza Seasonal Injectable 11/05/2008    Influenza, Unspecified 10/01/2020    Pneumococcal Conjugate 13-Valent (PCV13) 08/27/2015, 01/01/2016    Pneumococcal Conjugate 20-Valent (PCV20) 09/21/2023    Pneumococcal Polysaccharide (PPSV23) 01/01/2008, 11/01/2016    Shingrix 01/19/2022, 04/19/2022    Tdap 04/02/2013, 03/15/2024       Allergies   Allergen Reactions    Codeine Nausea Only

## 2025-05-28 ENCOUNTER — NURSING HOME (OUTPATIENT)
Dept: FAMILY MEDICINE CLINIC | Age: 89
End: 2025-05-28
Payer: MEDICARE

## 2025-05-28 VITALS
SYSTOLIC BLOOD PRESSURE: 114 MMHG | HEART RATE: 83 BPM | BODY MASS INDEX: 29.09 KG/M2 | WEIGHT: 164.2 LBS | OXYGEN SATURATION: 96 % | TEMPERATURE: 97.4 F | DIASTOLIC BLOOD PRESSURE: 59 MMHG | RESPIRATION RATE: 18 BRPM

## 2025-05-28 DIAGNOSIS — J06.9 VIRAL UPPER RESPIRATORY TRACT INFECTION: ICD-10-CM

## 2025-05-28 DIAGNOSIS — J30.9 ALLERGIC SINUSITIS: Primary | ICD-10-CM

## 2025-05-28 DIAGNOSIS — I10 ESSENTIAL HYPERTENSION: ICD-10-CM

## 2025-05-28 DIAGNOSIS — E10.9 TYPE 1 DIABETES MELLITUS WITHOUT COMPLICATION: ICD-10-CM

## 2025-05-28 RX ORDER — AZELASTINE 1 MG/ML
2 SPRAY, METERED NASAL 2 TIMES DAILY
Qty: 30 ML | Refills: 0 | Status: SHIPPED | OUTPATIENT
Start: 2025-05-28

## 2025-05-28 RX ORDER — PREDNISONE 20 MG/1
TABLET ORAL
Qty: 7 TABLET | Refills: 0 | Status: SHIPPED | OUTPATIENT
Start: 2025-05-28

## 2025-05-28 NOTE — PROGRESS NOTES
Lois Valentin presents for an evaluation physician visit at the [facility] [place]    Chief Complaint: Ongoing sinus pressure and congestion      Subjective      History of Present Illness      URI onset 15 days ago, she came into the office last week on 5/19/2025 with a productive cough and shortness of air.  She was already on Mucinex over the weekend to help loosen up the sputum.  She was also complaining of a runny nose and a cough that burns as well as a sore throat.  She had no fever nausea vomiting or ear pain.  She was treated with antibiotic for bronchitis as well as Flonase nasal spray.  The antibiotic was cefdinir 300 mg twice a day times 10 days.  She is improved, wheezing has resolved, cough is clear mucus, but her sinuses cont to be clogged with more clear mucus.  She has mild SOA, sandra with activity.  She is still fatigued, low energy, poor appetite        Objective   Vital Signs:   /59   Pulse 83   Temp 97.4 °F (36.3 °C)   Resp 18   Wt 74.5 kg (164 lb 3.2 oz)   SpO2 96%   BMI 29.09 kg/m²     Physical Exam  Vitals and nursing note reviewed.   Constitutional:       General: She is not in acute distress.     Appearance: Normal appearance. She is not ill-appearing, toxic-appearing or diaphoretic.   HENT:      Head: Normocephalic and atraumatic.      Right Ear: Tympanic membrane, ear canal and external ear normal.      Left Ear: Tympanic membrane, ear canal and external ear normal.      Nose: No congestion or rhinorrhea.      Mouth/Throat:      Mouth: Mucous membranes are moist.      Pharynx: Oropharynx is clear. No oropharyngeal exudate or posterior oropharyngeal erythema.   Eyes:      Extraocular Movements: Extraocular movements intact.      Conjunctiva/sclera: Conjunctivae normal.      Pupils: Pupils are equal, round, and reactive to light.   Cardiovascular:      Rate and Rhythm: Normal rate and regular rhythm.      Heart sounds: Normal heart sounds.   Pulmonary:      Effort:  Pulmonary effort is normal.      Breath sounds: Normal breath sounds. No wheezing, rhonchi or rales.   Abdominal:      General: Abdomen is flat.      Palpations: Abdomen is soft. There is no mass.      Tenderness: There is no abdominal tenderness.      Hernia: No hernia is present.   Musculoskeletal:      Cervical back: Neck supple. No rigidity.      Right lower leg: No edema.      Left lower leg: No edema.   Lymphadenopathy:      Cervical: No cervical adenopathy.   Skin:     General: Skin is warm and dry.   Neurological:      General: No focal deficit present.      Mental Status: She is alert and oriented to person, place, and time. Mental status is at baseline.   Psychiatric:         Mood and Affect: Mood normal.         Behavior: Behavior normal.         Thought Content: Thought content normal.         Judgment: Judgment normal.                  Assessment and Plan   Assessment & Plan  Allergic sinusitis  Ongoing sinus and chest congestion despite being on cefdinir 300 mg twice daily x 10 days.  She has been sick for 2 and half weeks.  The congestion is clear appearing.  Will go ahead and add a small dose of steroid 40 mg daily x 2 days then 20 mg daily x 3 days as well as Astelin nasal spray to try to clear her sinus congestion.  Orders:    predniSONE (DELTASONE) 20 MG tablet; 40 mg daily x 2 days, then 20 mg daily x 3 days    azelastine (ASTELIN) 0.1 % nasal spray; Administer 2 sprays into the nostril(s) as directed by provider 2 (Two) Times a Day. Use in each nostril as directed    Viral upper respiratory tract infection  I think her upper respiratory infection has resolved       Type 1 diabetes mellitus without complication  She is aware that her blood sugars may be elevated on the prednisone and she is to continue current treatment plan.  She is to call for blood pressures that greater than 300         Essential hypertension  Blood pressure stable and well-controlled.  No changes to current meds or treatment  "plan              Follow Up   No follow-ups on file.    Review of Systems   Constitutional:  Positive for fatigue. Negative for chills and fever.   HENT:  Positive for congestion and rhinorrhea. Negative for ear pain, sinus pressure and sore throat.    Eyes:  Negative for blurred vision and double vision.   Respiratory:  Positive for cough and chest tightness. Negative for shortness of breath and wheezing.    Cardiovascular:  Negative for chest pain and palpitations.   Gastrointestinal:  Negative for abdominal pain, blood in stool, constipation, diarrhea, nausea and vomiting.   Skin:  Negative for rash.   Neurological:  Negative for dizziness and headache.   Psychiatric/Behavioral:  Negative for sleep disturbance, suicidal ideas and depressed mood. The patient is not nervous/anxious.         Result Review   The following data was reviewed by Azul Broussard MD on 05/28/2025.  No results found for: \"WBC\", \"HGB\", \"HCT\", \"MCV\", \"PLT\"  No results found for: \"GLUCOSE\", \"BUN\", \"CREATININE\", \"NA\", \"K\", \"CL\", \"CALCIUM\", \"PROTEINTOT\", \"ALBUMIN\", \"ALT\", \"AST\", \"ALKPHOS\", \"BILITOT\", \"GLOB\", \"AGRATIO\", \"BCR\", \"ANIONGAP\", \"EGFR\"  No results found for: \"CHOL\", \"CHLPL\", \"TRIG\", \"HDL\", \"LDL\", \"LDLDIRECT\"  No results found for: \"TSH\"  No results found for: \"HGBA1C\"  No results found for: \"PSA\"    Part of this note may be an electronic transcription/translation of spoken language to printed   text using the Dragon Dictation System.      "

## 2025-05-28 NOTE — ASSESSMENT & PLAN NOTE
She is aware that her blood sugars may be elevated on the prednisone and she is to continue current treatment plan.  She is to call for blood pressures that greater than 300

## 2025-06-08 DIAGNOSIS — I10 ESSENTIAL (PRIMARY) HYPERTENSION: ICD-10-CM

## 2025-06-09 RX ORDER — ATORVASTATIN CALCIUM 40 MG/1
40 TABLET, FILM COATED ORAL
Qty: 30 TABLET | Refills: 6 | OUTPATIENT
Start: 2025-06-09

## 2025-06-09 RX ORDER — LOSARTAN POTASSIUM 50 MG/1
50 TABLET ORAL DAILY
Qty: 30 TABLET | Refills: 6 | OUTPATIENT
Start: 2025-06-09

## 2025-07-23 ENCOUNTER — TELEPHONE (OUTPATIENT)
Dept: FAMILY MEDICINE CLINIC | Age: 89
End: 2025-07-23

## 2025-07-23 ENCOUNTER — NURSING HOME (OUTPATIENT)
Dept: FAMILY MEDICINE CLINIC | Age: 89
End: 2025-07-23
Payer: MEDICARE

## 2025-07-23 VITALS
SYSTOLIC BLOOD PRESSURE: 122 MMHG | RESPIRATION RATE: 16 BRPM | HEART RATE: 92 BPM | HEIGHT: 63 IN | BODY MASS INDEX: 29.09 KG/M2 | TEMPERATURE: 97.4 F | OXYGEN SATURATION: 95 % | WEIGHT: 164.2 LBS | DIASTOLIC BLOOD PRESSURE: 64 MMHG

## 2025-07-23 DIAGNOSIS — M81.0 AGE-RELATED OSTEOPOROSIS WITHOUT CURRENT PATHOLOGICAL FRACTURE: ICD-10-CM

## 2025-07-23 DIAGNOSIS — E78.00 PURE HYPERCHOLESTEROLEMIA: ICD-10-CM

## 2025-07-23 DIAGNOSIS — I10 ESSENTIAL (PRIMARY) HYPERTENSION: Primary | ICD-10-CM

## 2025-07-23 DIAGNOSIS — R60.0 BILATERAL LEG EDEMA: ICD-10-CM

## 2025-07-23 DIAGNOSIS — M19.91 PRIMARY OSTEOARTHRITIS, UNSPECIFIED SITE: ICD-10-CM

## 2025-07-23 DIAGNOSIS — K21.9 GASTROESOPHAGEAL REFLUX DISEASE WITHOUT ESOPHAGITIS: ICD-10-CM

## 2025-07-23 DIAGNOSIS — K59.00 CONSTIPATION, UNSPECIFIED CONSTIPATION TYPE: ICD-10-CM

## 2025-07-23 DIAGNOSIS — E10.9 TYPE 1 DIABETES MELLITUS WITHOUT COMPLICATION: ICD-10-CM

## 2025-07-23 DIAGNOSIS — N39.46 MIXED STRESS AND URGE URINARY INCONTINENCE: ICD-10-CM

## 2025-07-23 DIAGNOSIS — J30.9 ALLERGIC RHINITIS, UNSPECIFIED SEASONALITY, UNSPECIFIED TRIGGER: ICD-10-CM

## 2025-07-23 RX ORDER — TRIAMCINOLONE ACETONIDE 1 MG/G
1 OINTMENT TOPICAL 2 TIMES DAILY
Qty: 30 G | Refills: 0 | Status: SHIPPED | OUTPATIENT
Start: 2025-07-23

## 2025-07-23 NOTE — PROGRESS NOTES
Lois Valentin presents for an evaluation physician visit at the [facility] [place]    Chief Complaint : Review CT scan abdomen findings and concerns with ongoing weight gain      Subjective      History of Present Illness    She presents with worsening joint pains in hands, knees, thighs and feet.  Pain moderate severity, comes and goes, soreness with standing and laying down at night, achey    She has a rash on her legs B      She saw Radha Pérez her endocrinologist and changed her to Mounjaro and decreaased her insulin dose.  Her hemoglobin A1c is 6.0% and excellent.  I encouraged daily exercise and she says she walks up and down the halls all day long here at [place]. Wt is stable but unchanged at 165#      She also complained of chronic lower extremity edema.  Abdominal CT was negative for any findings.  Echo of the heart showed 70% ejection fraction.  She is on lasix 20 mg daily,  she is also on a potassium supplement and the potassium pill comes out holding her stool so she does not feel like it is being absorbed.She wears her compression stockings and avoid Na in her diet      She  presents with chronic hypertension.  Her current medications include atenolol 25 mg every night, losartan 50 mg every day and lasix.  She is tolerating medication well.  She knows she is to avoid salt in her diet.  Weight loss would also help her blood pressure.    She presents with hypercholesterolemia and she is currently stable on atorvastatin 40 mg every night.She tolerates meds well    She has OP of hip, 2024 dexa showed   DXA bone density spine and hip  Impression  1. Normal BMD of the lumbar spine. No increased risk for fracture.  2. Osteoporotic BMD of the left femoral neck. Increased risk for  fracture.   Previous Dexa hip T score -2.8 and L spine -1.4  SHE IS ON PROLIA and tolerated well.  Will check on when her Prolia was first started.  She thinks she is only been on it for 2 years.    Her fatigue is  "stable on daily B12 and Vit D.  She is sleeping well at night    She presents with chronic constipation-remains well controlled on docusate sodium 100 mg 2 capsules once daily.    She presents with chronic urinary incontinence and is currently on Myrbetriq 50 mg daily and reports today she is doing well    Her chronic GERD and she is well controlled on omeprazole 20 lmg daily    H/o a fall with low back pain that is stable and she is now using a walker for balance and she is much better, no c/o pain today s/p therapy.  She still uses a walker    Labs 5/7/2025:  , creatinine 0.69, GFR 84, sodium 134, potassium 4.5, calcium 9.7, bilirubin 0.4, alkaline phosphatase 62, AST 21, ALT 13, cholesterol 147, triglycerides 92, HDL 65, LDL 65, hemoglobin A1c 6%, TSH 1.48, vitamin D 37.6, B12 1701        Current medications include:  atenolol 25 mg nightly, atorvastatin 40 mg nightly, calcium citrate 600 mg twice daily, docusate sodium 100 mg 2 pills once a day, estradiol 0.01% cream vaginally nightly, Lasix 20 mg daily, Tresiba 20 units daily, losartan 50 mg daily, magnesium oxide 400 mg daily, meloxicam 15 mg daily, Myrbetriq 50 mg daily, omeprazole 20 mg daily, Mounjaro 10 mg weekly, spironolactone 25 mg daily, Prolia injections twice a year, boo continuous glucose monitoring device, vitamin B12 1000 mcg daily and vitamin D 1000 units daily.  She also has as needed medication of diclofenac gel 1%, meclizine, Tylenol, Zofran, MiraLAX, TRUEplus chew     Objective   Vital Signs:   /64   Pulse 92   Temp 97.4 °F (36.3 °C)   Resp 16   Ht 160 cm (63\")   Wt 74.5 kg (164 lb 3.2 oz)   SpO2 95%   BMI 29.09 kg/m²     Physical Exam  Vitals and nursing note reviewed.   Constitutional:       General: She is not in acute distress.     Appearance: Normal appearance. She is not ill-appearing, toxic-appearing or diaphoretic.   HENT:      Head: Normocephalic and atraumatic.      Right Ear: Tympanic membrane, ear canal and " external ear normal.      Left Ear: Tympanic membrane, ear canal and external ear normal.      Nose: No congestion or rhinorrhea.      Mouth/Throat:      Mouth: Mucous membranes are moist.      Pharynx: Oropharynx is clear. No oropharyngeal exudate or posterior oropharyngeal erythema.   Eyes:      Extraocular Movements: Extraocular movements intact.      Conjunctiva/sclera: Conjunctivae normal.      Pupils: Pupils are equal, round, and reactive to light.   Cardiovascular:      Rate and Rhythm: Normal rate and regular rhythm.      Heart sounds: Normal heart sounds. No murmur heard.  Pulmonary:      Effort: Pulmonary effort is normal.      Breath sounds: Normal breath sounds. No wheezing, rhonchi or rales.   Abdominal:      General: Abdomen is flat.      Palpations: Abdomen is soft. There is no mass.      Tenderness: There is no abdominal tenderness.      Hernia: No hernia is present.   Musculoskeletal:      Cervical back: Neck supple. No rigidity.      Right lower le+ Edema present.      Left lower le+ Edema present.   Lymphadenopathy:      Cervical: No cervical adenopathy.   Skin:     General: Skin is warm and dry.      Comments: Erythematous patchy ecxema rash R shin medially   Neurological:      General: No focal deficit present.      Mental Status: She is alert and oriented to person, place, and time. Mental status is at baseline.      Motor: Weakness present.      Gait: Gait abnormal.   Psychiatric:         Mood and Affect: Mood normal.         Behavior: Behavior normal.         Thought Content: Thought content normal.         Judgment: Judgment normal.       BMI is >= 25 and <30. (Overweight) The following options were offered after discussion;: exercise counseling/recommendations and nutrition counseling/recommendations           Assessment and Plan   Diagnoses and all orders for this visit:    1. Essential (primary) hypertension (Primary)  Comments:  Blood pressure stable and well-controlled.  No  changes need to current meds or treatment plan.  Avoid any in diet    2. Type 1 diabetes mellitus without complication  Comments:  She is in the prediabetic range, recc she can d/w her endocrinologist decreasing her insulin and cont the Mounjaro. Encourage daily exercise/yearly eye exams    3. Bilateral leg edema  Comments:  Wears compression stockings and her lower extremity edema stable and well-controlled with Lasix and spironolactone    4. Pure hypercholesterolemia  Comments:  Still stable and well-controlled on atorvastatin and she tolerates med well.  She should also follow low-cholesterol diet    5. Allergic rhinitis, unspecified seasonality, unspecified trigger  Comments:  no acute issues with her allergies today.    6. Constipation, unspecified constipation type  Comments:  stable, no changes to current tx plan    7. Mixed stress and urge urinary incontinence  Comments:  Courage Kegel exercises and continue vaginal estrogen    8. Gastroesophageal reflux disease without esophagitis  Comments:  Her heartburn is stable and well-controlled. She should avoid spicy acidic foods and continue omeprazole 20 mg daily    9. Age-related osteoporosis without current pathological fracture  Comments:  She has completed Prolia and would like to not take Fosamax at this time so I will DC it from her list and recheck DEXA in 2 years    10. Primary osteoarthritis, unspecified site  Comments:  Multiple areas of osteoarthritis with chronic arthralgias.  On meloxicam.  Will add kayley and turmeric    Other orders  -     triamcinolone (KENALOG) 0.1 % ointment; Apply 1 Application topically to the appropriate area as directed 2 (Two) Times a Day.  Dispense: 30 g; Refill: 0              Follow Up   Return in about 3 months (around 10/23/2025) for Recheck, Medicare Wellness.    Review of Systems   Constitutional:  Positive for fatigue and unexpected weight gain. Negative for chills and fever.   HENT:  Negative for ear pain, sinus  "pressure and sore throat.    Eyes:  Negative for blurred vision and double vision.   Respiratory:  Negative for cough, shortness of breath and wheezing.    Cardiovascular:  Positive for leg swelling. Negative for chest pain and palpitations.   Gastrointestinal:  Positive for abdominal pain. Negative for blood in stool, constipation, diarrhea, nausea and vomiting.   Musculoskeletal:  Positive for arthralgias.   Skin:  Positive for rash.   Neurological:  Negative for dizziness and headache.   Psychiatric/Behavioral:  Negative for sleep disturbance, suicidal ideas and depressed mood. The patient is not nervous/anxious.         Result Review   The following data was reviewed by Azul Broussard MD on 10/09/2024.  No results found for: \"WBC\", \"HGB\", \"HCT\", \"MCV\", \"PLT\"  No results found for: \"GLUCOSE\", \"BUN\", \"CREATININE\", \"NA\", \"K\", \"CL\", \"CALCIUM\", \"PROTEINTOT\", \"ALBUMIN\", \"ALT\", \"AST\", \"ALKPHOS\", \"BILITOT\", \"GLOB\", \"AGRATIO\", \"BCR\", \"ANIONGAP\", \"EGFR\"  No results found for: \"CHOL\", \"CHLPL\", \"TRIG\", \"HDL\", \"LDL\", \"LDLDIRECT\"  No results found for: \"TSH\"  No results found for: \"HGBA1C\"  No results found for: \"PSA\"    Part of this note may be an electronic transcription/translation of spoken language to printed   text using the Dragon Dictation System.      "